# Patient Record
Sex: FEMALE | Race: WHITE
[De-identification: names, ages, dates, MRNs, and addresses within clinical notes are randomized per-mention and may not be internally consistent; named-entity substitution may affect disease eponyms.]

---

## 2018-01-16 ENCOUNTER — HOSPITAL ENCOUNTER (EMERGENCY)
Dept: HOSPITAL 62 - ER | Age: 53
Discharge: LEFT BEFORE DISCHARGE | End: 2018-01-16
Payer: MEDICAID

## 2018-01-16 VITALS — SYSTOLIC BLOOD PRESSURE: 121 MMHG | DIASTOLIC BLOOD PRESSURE: 70 MMHG

## 2018-01-16 DIAGNOSIS — Z53.21: Primary | ICD-10-CM

## 2018-04-26 ENCOUNTER — HOSPITAL ENCOUNTER (EMERGENCY)
Dept: HOSPITAL 62 - ER | Age: 53
Discharge: HOME | End: 2018-04-26
Payer: MEDICAID

## 2018-04-26 VITALS — DIASTOLIC BLOOD PRESSURE: 88 MMHG | SYSTOLIC BLOOD PRESSURE: 148 MMHG

## 2018-04-26 DIAGNOSIS — M54.9: ICD-10-CM

## 2018-04-26 DIAGNOSIS — F17.200: ICD-10-CM

## 2018-04-26 DIAGNOSIS — N30.00: ICD-10-CM

## 2018-04-26 DIAGNOSIS — I10: ICD-10-CM

## 2018-04-26 DIAGNOSIS — R50.9: ICD-10-CM

## 2018-04-26 DIAGNOSIS — J44.9: ICD-10-CM

## 2018-04-26 DIAGNOSIS — J11.1: Primary | ICD-10-CM

## 2018-04-26 DIAGNOSIS — G89.29: ICD-10-CM

## 2018-04-26 LAB
ADD MANUAL DIFF: NO
ALBUMIN SERPL-MCNC: 4 G/DL (ref 3.5–5)
ALP SERPL-CCNC: 101 U/L (ref 38–126)
ALT SERPL-CCNC: 34 U/L (ref 9–52)
ANION GAP SERPL CALC-SCNC: 10 MMOL/L (ref 5–19)
APPEARANCE UR: CLEAR
APTT PPP: YELLOW S
AST SERPL-CCNC: 22 U/L (ref 14–36)
BASOPHILS # BLD AUTO: 0 10^3/UL (ref 0–0.2)
BASOPHILS NFR BLD AUTO: 0.3 % (ref 0–2)
BILIRUB DIRECT SERPL-MCNC: 0.3 MG/DL (ref 0–0.4)
BILIRUB SERPL-MCNC: 0.4 MG/DL (ref 0.2–1.3)
BILIRUB UR QL STRIP: NEGATIVE
BUN SERPL-MCNC: 15 MG/DL (ref 7–20)
CALCIUM: 11.1 MG/DL (ref 8.4–10.2)
CHLORIDE SERPL-SCNC: 108 MMOL/L (ref 98–107)
CO2 SERPL-SCNC: 24 MMOL/L (ref 22–30)
EOSINOPHIL # BLD AUTO: 0 10^3/UL (ref 0–0.6)
EOSINOPHIL NFR BLD AUTO: 0.2 % (ref 0–6)
ERYTHROCYTE [DISTWIDTH] IN BLOOD BY AUTOMATED COUNT: 13.9 % (ref 11.5–14)
GLUCOSE SERPL-MCNC: 91 MG/DL (ref 75–110)
GLUCOSE UR STRIP-MCNC: NEGATIVE MG/DL
HCT VFR BLD CALC: 35.4 % (ref 36–47)
HGB BLD-MCNC: 12 G/DL (ref 12–15.5)
KETONES UR STRIP-MCNC: NEGATIVE MG/DL
LYMPHOCYTES # BLD AUTO: 1.4 10^3/UL (ref 0.5–4.7)
LYMPHOCYTES NFR BLD AUTO: 12.1 % (ref 13–45)
MCH RBC QN AUTO: 31 PG (ref 27–33.4)
MCHC RBC AUTO-ENTMCNC: 33.9 G/DL (ref 32–36)
MCV RBC AUTO: 92 FL (ref 80–97)
MONOCYTES # BLD AUTO: 0.9 10^3/UL (ref 0.1–1.4)
MONOCYTES NFR BLD AUTO: 7.6 % (ref 3–13)
NEUTROPHILS # BLD AUTO: 9 10^3/UL (ref 1.7–8.2)
NEUTS SEG NFR BLD AUTO: 79.8 % (ref 42–78)
NITRITE UR QL STRIP: NEGATIVE
PH UR STRIP: 6 [PH] (ref 5–9)
PLATELET # BLD: 241 10^3/UL (ref 150–450)
POTASSIUM SERPL-SCNC: 4.8 MMOL/L (ref 3.6–5)
PROT SERPL-MCNC: 6.9 G/DL (ref 6.3–8.2)
PROT UR STRIP-MCNC: NEGATIVE MG/DL
RBC # BLD AUTO: 3.87 10^6/UL (ref 3.72–5.28)
SODIUM SERPL-SCNC: 141.5 MMOL/L (ref 137–145)
SP GR UR STRIP: 1.01
TOTAL CELLS COUNTED % (AUTO): 100 %
UROBILINOGEN UR-MCNC: 2 MG/DL (ref ?–2)
WBC # BLD AUTO: 11.3 10^3/UL (ref 4–10.5)

## 2018-04-26 PROCEDURE — 99284 EMERGENCY DEPT VISIT MOD MDM: CPT

## 2018-04-26 PROCEDURE — 36415 COLL VENOUS BLD VENIPUNCTURE: CPT

## 2018-04-26 PROCEDURE — 87086 URINE CULTURE/COLONY COUNT: CPT

## 2018-04-26 PROCEDURE — 80053 COMPREHEN METABOLIC PANEL: CPT

## 2018-04-26 PROCEDURE — 94640 AIRWAY INHALATION TREATMENT: CPT

## 2018-04-26 PROCEDURE — 71046 X-RAY EXAM CHEST 2 VIEWS: CPT

## 2018-04-26 PROCEDURE — 96365 THER/PROPH/DIAG IV INF INIT: CPT

## 2018-04-26 PROCEDURE — 85025 COMPLETE CBC W/AUTO DIFF WBC: CPT

## 2018-04-26 PROCEDURE — 81001 URINALYSIS AUTO W/SCOPE: CPT

## 2018-04-26 NOTE — RADIOLOGY REPORT (SQ)
EXAM DESCRIPTION:  CHEST 2 VIEWS



COMPLETED DATE/TIME:  4/26/2018 10:30 am



REASON FOR STUDY:  cough with fever



COMPARISON:  9/25/2014



EXAM PARAMETERS:  NUMBER OF VIEWS: two views

TECHNIQUE: Digital Frontal and Lateral radiographic views of the chest acquired.

RADIATION DOSE: NA

LIMITATIONS: none



FINDINGS:  LUNGS AND PLEURA: No opacities, masses or pneumothorax. No pleural effusion.

MEDIASTINUM AND HILAR STRUCTURES: No masses or contour abnormalities.

HEART AND VASCULAR STRUCTURES: Heart normal size.  No evidence for failure.

BONES: No acute findings.

HARDWARE: None in the chest.

OTHER: No other significant finding.



IMPRESSION:  NO ACUTE RADIOGRAPHIC FINDING IN THE CHEST.



TECHNICAL DOCUMENTATION:  JOB ID:  [id number]

 2011 [employer]- All Rights Reserved



Reading location - IP/workstation name: [email or web address]

## 2018-04-26 NOTE — ER DOCUMENT REPORT
ED General





- General


Chief Complaint: Fever


Stated Complaint: FEVER


Time Seen by Provider: 04/26/18 09:05


Mode of Arrival: Ambulatory


Information source: Patient


TRAVEL OUTSIDE OF THE [place] IN LAST 30 DAYS: No





- HPI


Notes: 





52-year-old female with a history of COPD, hypertension, hyperlipidemia and 

chronic back pain presents today with complaints of chills and coughing for the 

last 2 days.  Patient is a pack a day smoker.  Worse with time, nothing makes 

better.  Took Tylenol this morning.  Denies any hemoptysis.  Patient does not 

know if she had an exact fever because she did not take her temperature but 

reported chills.  Eating and drinking without issues.  Denies fevers, chest pain

,palpitations,  shortness of breath, dyspnea, nausea, vomiting, diarrhea, 

abdominal pain, hematuria,blurred vision, double vision, loss of vision, speech 

changes, LH, dizziness, syncope, headaches, wheezing, ST, neck pain, weakness, 

bowel or bladder dysfunction, saddle anesthesia, numbness or tingling in 

bilateral upper or lower extremities equally, muscle paralysis, weakness in 

bilateral upper or lower extremities equally or rash. Denies IV drug use.





- Related Data


Allergies/Adverse Reactions: 


 





azithromycin [Azithromycin] Allergy (Verified 01/16/18 18:37)


 


latex [Latex] Allergy (Verified 01/16/18 18:37)


 


prednisone [Prednisone] Allergy (Verified 01/16/18 18:37)


 











Past Medical History





- General


Information source: Patient





- Social History


Smoking Status: Current Every Day Smoker


Frequency of alcohol use: Occasional


Drug Abuse: None


Family History: Reviewed & Not Pertinent, Other - Blood clot, daughter Son and 

mother kidney disease


Patient has suicidal ideation: No


Patient has homicidal ideation: No





- Past Medical History


Cardiac Medical History: Reports: Hx DVT, Hx Hypercholesterolemia, Hx 

Hypertension


Pulmonary Medical History: Reports: Hx COPD


Neurological Medical History: Reports: Hx Migraine.  Denies: Hx Seizures


Renal/ Medical History: Reports: Hx Kidney Stones.  Denies: Hx Peritoneal 

Dialysis


GI Medical History: Reports: Hx Gastroesophageal Reflux Disease


Musculoskeltal Medical History: Reports Hx Arthritis


Psychiatric Medical History: Reports: Hx Depression


Past Surgical History: Reports: Hx Hysterectomy, Hx Orthopedic Surgery - back x 

6, Hx Tubal Ligation





- Immunizations


Hx Diphtheria, Pertussis, Tetanus Vaccination: Yes





Review of Systems





- Review of Systems


Constitutional: No symptoms reported


EENT: See HPI


Cardiovascular: No symptoms reported


Respiratory: See HPI


Gastrointestinal: No symptoms reported


Genitourinary: No symptoms reported


Female Genitourinary: No symptoms reported


Musculoskeletal: No symptoms reported


Skin: No symptoms reported


Hematologic/Lymphatic: No symptoms reported


Neurological/Psychological: No symptoms reported





Physical Exam





- Vital signs


Vitals: 


 











Temp Pulse Resp BP Pulse Ox


 


 100.0 F   118 H  16   130/84 H  97 


 


 04/26/18 08:57  04/26/18 08:57  04/26/18 08:57  04/26/18 08:57  04/26/18 08:57














- Notes


Notes: 





PHYSICAL EXAMINATION:





GENERAL: Well-appearing, well-nourished and in no acute distress.





HEAD: Atraumatic, normocephalic.





EYES: Pupils equal round and reactive to light, extraocular movements intact, 

conjunctiva are normal.





ENT: Nares patent, oropharynx clear without exudates.  Moist mucous membranes.





NECK: Normal range of motion, supple without lymphadenopathy





LUNGS: Diminished breath sounds in bilateral upper lobe, cleared after 

breathing treatment.  no wheezes rales or rhonchi.





HEART: Regular rate and rhythm without murmurs





ABDOMEN: Soft, nontender, nondistended abdomen.  No guarding, no rebound.  No 

masses appreciated.





Female : deferred





Musculoskeletal: Normal range of motion, no pitting or edema.  No cyanosis.





NEUROLOGICAL: Cranial nerves grossly intact.  Normal speech, normal gait.  

Normal sensory, motor exams





PSYCH: Normal mood, normal affect.





SKIN: Warm, Dry, normal turgor, no rashes or lesions noted.





Course





- Re-evaluation


Re-evalutation: 





04/26/18 11:00


52-year-old female who is afebrile, slightly tachycardic stress and vitals are 

otherwise stable for evaluation of fever, congestion with dysuria.  CBC shows 

slight leukocytosis without anemia.  CMP negative for acute renal failure 

however this seems to be her baseline with a Cr of 1.31, which is elevated from 

when seen by her PCP, Dr. Bart Cowan office, with a BUN of 15  We will 

give patient 1 L of normal saline fluid and Rocephin IV.  tachycardia is likely 

related to her fever.  Patient given Tylenol.  Patient likely has influenza a 

sudden onset of fevers with myalgias.  Was started on Tamiflu.  Urinalysis 

shows that patient does have a UTI.  Spoke with Dr. Bart Cowan office at 

1130 who is her primary care provider, he will see her tomorrow before 11 AM in 

his office.  Will place patient on bactrim BID.  On another reevaluation, 

patient reports she is feeling better.  At this time will discharge with return 

precautions and follow-up recommendations.  Verbal discharge instructions given 

a the bedside and opportunity for questions given. Medication warnings 

reviewed. Patient is in agreement with this plan and has verbalized 

understanding of return precautions and the need for primary care follow-up in 

the next 24-72 hours.











After performing a Medical Screening Examination, I estimate there is LOW risk 

for ACUTE CORONARY SYNDROME, PULMONARY EMBOLI, RESPIRATORY FAILURE, SEPSIS OR 

MENINGITIS, thus I consider the discharge disposition reasonable.  I have 

reevaluated this patient multiple times and no significant life threatening 

changes are noted. The patient and I have discussed the diagnosis and risks, 

and we agree with discharging home with close follow-up. We also discussed 

returning to the Emergency Department immediately if new or worsening symptoms 

occur. We have discussed the symptoms which are most concerning (e.g., changing 

or worsening pain, trouble swallowing or breathing, neck stiffness, fever) that 

necessitate immediate return.














- Vital Signs


Vital signs: 


 











Temp Pulse Resp BP Pulse Ox


 


 100.0 F   118 H  16   130/84 H  97 


 


 04/26/18 08:57  04/26/18 08:57  04/26/18 08:57  04/26/18 08:57  04/26/18 08:57














- Laboratory


Result Diagrams: 


 04/26/18 09:45





 04/26/18 09:45


Laboratory results interpreted by me: 


 











  04/26/18 04/26/18 04/26/18





  09:45 09:45 09:45


 


WBC  11.3 H  


 


Hct  35.4 L  


 


Seg Neutrophils %  79.8 H  


 


Lymphocytes %  12.1 L  


 


Absolute Neutrophils  9.0 H  


 


Chloride   108 H 


 


Creatinine   1.31 H 


 


Est GFR ( Amer)   52 L 


 


Est GFR (Non-Af Amer)   43 L 


 


Calcium   11.1 H 


 


Urine Urobilinogen    2.0 H


 


Ur Leukocyte Esterase    TRACE H














Discharge





- Discharge


Clinical Impression: 


 URI with cough and congestion, Flu syndrome





UTI (urinary tract infection)


Qualifiers:


 Urinary tract infection type: acute cystitis Hematuria presence: without 

hematuria Qualified Code(s): N30.00 - Acute cystitis without hematuria





Condition: Good


Disposition: HOME, SELF-CARE


Instructions:  Trimethoprim-Sulfa ([facility]), Upper Respiratory Illness ([facility]), 

Urinary Anesthetic Agent ([facility]), Urinary Tract Infection ([facility]), Influenza ([facility])


Additional Instructions: 


Upper Respiratory Illness





     You have a viral infection of the respiratory passages -- a "cold."  This 

common infection causes nasal congestion, drainage, and often sore throat and 

cough.  It is caused by a virus and is highly contagious.  The disease usually 

lasts a week or more, though the worst symptoms are usually over in 3 or 4 days.


     There is no "cure" for the viral infection -- it must run its course.  If 

there is a complication, such as bacterial infection in the nose, sinuses, 

middle ear, or bronchial tubes, antibiotics may be required, but antibiotics won

't affect the virus.


     If you smoke, you should STOP!!  Drink plenty of fluids.  A humidifier may 

help.  An expectorant medication or decongestant may make you more comfortable.

  Use acetaminophen or ibuprofen for fever or aches.


     See the doctor if fever persists over two or three days, if there is any 

significant worsening of your symptoms, or if you simply fail to improve as 

expected.








Urinary Tract Infection





     Your evaluation indicates that you have a urinary tract infection. This is 

due to germs growing in the bladder.  This is a common problem.


     This infection usually responds quickly to antibiotics.  Your antibiotic 

should be taken exactly as prescribed.  Drink plenty of fluids -- three to four 

quarts a day.


     Occasionally, a bladder anesthetic will be prescribed to help stop the 

feeling of urgency until the antibiotic has a chance to clear the infection.  

This may cause your urine to be dark orange.


     Certain urine infections require a culture.  If the doctor obtained a 

culture, the results will be back in two days.  You should call to see if a 

change in treatment is needed.


     A repeat urinalysis after you finish treatment is often recommended.  The 

physician will let you know if further testing is required.


     Call the doctor if you develop fever, chills, flank pain, inability to 

urinate, or blood in the urine.





Follow-up with primary care provider within 24 hours Take antibiotic with food 

as directed.  Take Pyridium 3 times a day as needed, this will turn her urine 

orange.  Increase oral hydration with water.  Drink cranberry juice for you to 

help assist in treating her UTI.  Use Ventolin inhaler as needed.  Take over-the

-counter ibuprofen and Tylenol as directed.  Return to the ER symptoms become 

worse.





Return immediately for any new or worsening symptoms.





Follow up with primary care provider, call tomorrow to make followup 

appointment.





Prescriptions: 


Benzonatate [Tessalon Perles 100 mg Capsule] 100 mg PO Q8HP PRN #20 capsule


 PRN Reason: 


Albuterol Sulfate [Ventolin Hfa] 1 - 2 puff IH Q4 PRN #1 [id number]


 PRN Reason: 


Sulfamethoxazole/Trimethoprim [Bactrim Ds Tablet] 1 each PO BID #20 tablet


Forms:  Return to Work


Referrals: 


BART COWAN MD [Primary Care Provider] - Follow up tomorrow

## 2018-05-04 ENCOUNTER — HOSPITAL ENCOUNTER (OUTPATIENT)
Dept: HOSPITAL 62 - RAD | Age: 53
End: 2018-05-04
Attending: INTERNAL MEDICINE
Payer: MEDICAID

## 2018-05-04 DIAGNOSIS — N18.3: Primary | ICD-10-CM

## 2018-05-04 PROCEDURE — 76770 US EXAM ABDO BACK WALL COMP: CPT

## 2018-05-04 NOTE — RADIOLOGY REPORT (SQ)
EXAM DESCRIPTION:  U/S RETROPERITON (RENAL/AORTA)



COMPLETED DATE/TIME:  5/4/2018 9:57 am



REASON FOR STUDY:  CKD III (N18.3) N18.3  CHRONIC KIDNEY DISEASE, STAGE 3 (MODERATE)



COMPARISON:  None.



TECHNIQUE:  Dynamic and static grayscale images acquired of the kidneys and bladder and recorded on P
ACS. Additional selected color Doppler and spectral images recorded.



LIMITATIONS:  None.



FINDINGS:  RIGHT KIDNEY: Normal size, 9.5 x 4.8 x 4.9 cm.  Normal echogenicity. No solid or suspiciou
s masses. No hydronephrosis. No calcifications.

LEFT KIDNEY:  Normal size, 9.9 x 4.4 x 4.3 cm.  . Normal echogenicity. No solid or suspicious masses.
 No hydronephrosis. No calcifications.

BLADDER: The bladder is incompletely filled and not well evaluated.  Urinary jets were not seen.

OTHER FINDINGS: No other significant finding.



IMPRESSION:  Normal renal ultrasound.  The bladder was not well evaluated.



TECHNICAL DOCUMENTATION:  JOB ID:  [id number]

 2011 [employer]- All Rights Reserved



Reading location - IP/workstation name: [email or web address]

## 2018-06-11 ENCOUNTER — HOSPITAL ENCOUNTER (OUTPATIENT)
Dept: HOSPITAL 62 - RAD | Age: 53
End: 2018-06-11
Attending: INTERNAL MEDICINE
Payer: MEDICAID

## 2018-06-11 DIAGNOSIS — R13.19: Primary | ICD-10-CM

## 2018-06-11 PROCEDURE — 74220 X-RAY XM ESOPHAGUS 1CNTRST: CPT

## 2018-06-11 NOTE — RADIOLOGY REPORT (SQ)
EXAM DESCRIPTION:  BARIUM SWALLOW ESOPHAGUS



COMPLETED DATE/TIME:  6/11/2018 8:49 am



REASON FOR STUDY:  INTERMITTENT DYSPHAGIA (R13.19) R13.19  OTHER DYSPHAGIA



COMPARISON:  Chest films 4/26/2018, 10/26/2017



TECHNIQUE:  Under fluoroscopic guidance, patient ingested effervescent granules followed by thick and
 thin barium. Fluoroscopic spot images and routine radiographic images acquired and stored on PACS.

12 MM BARIUM TABLET GIVEN: Yes.

No significant delay in passage.



LIMITATIONS:  None.



FLUOROSCOPY TIME:  FLUORO TIME: 1 minutes 37 seconds

4 series of digital radiographic images saved to PACS.



FINDINGS:  NEUROMUSCULAR COORDINATION OF SWALLOW: Normal. No aspiration.

ESOPHAGEAL MOTILITY: Normal peristalsis. No esophageal spasm.

ESOPHAGEAL MUCOSA: Normal mucosa without masses or ulceration.

GASTRO-ESOPHAGEAL JUNCTION: No hiatal hernia or reflux.

NON-GI TRACT STRUCTURES: No significant finding.

OTHER: No other significant finding.



IMPRESSION:  NORMAL DOUBLE CONTRAST BARIUM SWALLOW.



COMMENT:  Quality :  Final reports for procedures using fluoroscopy that document radiation exp
osure indices, or exposure time and number of fluorographic images (if radiation exposure indices are
 not available)



TECHNICAL DOCUMENTATION:  JOB ID:  [id number]

 2011 [email or web address]- All Rights Reserved



Reading location - IP/workstation name: [facility]

## 2018-08-24 ENCOUNTER — HOSPITAL ENCOUNTER (EMERGENCY)
Dept: HOSPITAL 62 - ER | Age: 53
Discharge: HOME | End: 2018-08-24
Payer: MEDICAID

## 2018-08-24 VITALS — SYSTOLIC BLOOD PRESSURE: 117 MMHG | DIASTOLIC BLOOD PRESSURE: 72 MMHG

## 2018-08-24 DIAGNOSIS — N76.0: Primary | ICD-10-CM

## 2018-08-24 DIAGNOSIS — Z87.440: ICD-10-CM

## 2018-08-24 DIAGNOSIS — Z87.442: ICD-10-CM

## 2018-08-24 DIAGNOSIS — I10: ICD-10-CM

## 2018-08-24 DIAGNOSIS — R51: ICD-10-CM

## 2018-08-24 DIAGNOSIS — Z71.6: ICD-10-CM

## 2018-08-24 DIAGNOSIS — J44.9: ICD-10-CM

## 2018-08-24 DIAGNOSIS — R30.0: ICD-10-CM

## 2018-08-24 DIAGNOSIS — R09.82: ICD-10-CM

## 2018-08-24 DIAGNOSIS — Z88.8: ICD-10-CM

## 2018-08-24 DIAGNOSIS — R05: ICD-10-CM

## 2018-08-24 DIAGNOSIS — R39.15: ICD-10-CM

## 2018-08-24 DIAGNOSIS — R10.9: ICD-10-CM

## 2018-08-24 DIAGNOSIS — F17.210: ICD-10-CM

## 2018-08-24 DIAGNOSIS — R35.0: ICD-10-CM

## 2018-08-24 DIAGNOSIS — R09.81: ICD-10-CM

## 2018-08-24 DIAGNOSIS — Z91.041: ICD-10-CM

## 2018-08-24 DIAGNOSIS — J00: ICD-10-CM

## 2018-08-24 DIAGNOSIS — B96.89: ICD-10-CM

## 2018-08-24 LAB
APPEARANCE UR: CLEAR
APTT PPP: YELLOW S
BACTERIA (WET MOUNT): (no result)
BILIRUB UR QL STRIP: NEGATIVE
CHLAM PCR: NOT DETECTED
EPITHELIALS (WET MOUNT): (no result)
GLUCOSE UR STRIP-MCNC: NEGATIVE MG/DL
GON PCR: NOT DETECTED
KETONES UR STRIP-MCNC: NEGATIVE MG/DL
NITRITE UR QL STRIP: NEGATIVE
PH UR STRIP: 6 [PH] (ref 5–9)
PROT UR STRIP-MCNC: NEGATIVE MG/DL
SP GR UR STRIP: 1.01
T.VAGINALIS (WET MOUNT): (no result)
UROBILINOGEN UR-MCNC: NEGATIVE MG/DL (ref ?–2)
WBCS (WET MOUNT): (no result)
YEAST (WET MOUNT): (no result)

## 2018-08-24 PROCEDURE — 81001 URINALYSIS AUTO W/SCOPE: CPT

## 2018-08-24 PROCEDURE — 99406 BEHAV CHNG SMOKING 3-10 MIN: CPT

## 2018-08-24 PROCEDURE — 96372 THER/PROPH/DIAG INJ SC/IM: CPT

## 2018-08-24 PROCEDURE — 87591 N.GONORRHOEAE DNA AMP PROB: CPT

## 2018-08-24 PROCEDURE — 99283 EMERGENCY DEPT VISIT LOW MDM: CPT

## 2018-08-24 PROCEDURE — 87491 CHLMYD TRACH DNA AMP PROBE: CPT

## 2018-08-24 PROCEDURE — 87210 SMEAR WET MOUNT SALINE/INK: CPT

## 2018-08-24 PROCEDURE — 87086 URINE CULTURE/COLONY COUNT: CPT

## 2018-08-24 NOTE — ER DOCUMENT REPORT
ED GI/





- General


Chief Complaint: Pain With Urination


Stated Complaint: CONGESTION, HEADACHE, SORE THROAT


Time Seen by Provider: 08/24/18 15:35


Mode of Arrival: Ambulatory


Information source: Patient


Notes: 





52-year-old female presents to ED for complaint of pain and burning with 

urination and abnormal smells with urination for the last 4 days.  She states 

she has a history of kidney stones and UTIs.  She states she has been trying to 

drink more water but it is not improved.  She states she also has cough cold 

congestion runny nose with headache the last 2-3 days.  She states she took 

some Tylenol but she did not have any Mucinex at home.  She states she would 

love some Mucinex at this time.  She states that she does not think she has any 

STDs but she would like to be checked due to the foul-smelling to the urine.  

Patient is alert and oriented, pupils equal and react to light, respirations 

regular, and unlabored speaking in full sentences.


TRAVEL OUTSIDE OF THE [place] IN LAST 30 DAYS: No





- HPI


Patient complains to provider of: Dysuria, Vaginal discharge, Other - Runny 

nose cough congestion headache for the last 2-3 days


Onset: Other - See above


Timing/Duration: Gradual, Persistent


Quality of pain: Achy - Headache, Burning - Burning with urination


Severity at maximum: Moderate


Severity in ED: Moderate


Pain Level: 3


Location: Pelvis, Vaginal, Other - Headache


Vaginal bleeding (Compared to normal period): None


Sexual history: Active


Associated symptoms: Urinary frequency, Urinary urgency, Vaginal discharge, 

Other - Burning with urination, cough cold congestion runny nose headache


Exacerbated by: Other - Urination


Relieved by: Denies


Similar symptoms previously: Yes


Recently seen / treated by doctor: No





- Related Data


Allergies/Adverse Reactions: 


 





latex [Latex] Allergy (Verified 01/16/18 18:37)


 


prednisone [Prednisone] Allergy (Verified 01/16/18 18:37)


 











Past Medical History





- General


Information source: Patient





- Social History


Smoking Status: Current Every Day Smoker


Cigarette use (# per day): Yes - Pack per day


Chew tobacco use (# tins/day): No


Smoking Education Provided: Yes - 4 minutes


Frequency of alcohol use: Rare


Drug Abuse: None


Family History: Reviewed & Not Pertinent, Other - Blood clot, daughter Son and 

mother kidney disease


Patient has suicidal ideation: No


Patient has homicidal ideation: No





- Past Medical History


Cardiac Medical History: Reports: Hx DVT, Hx Hypercholesterolemia, Hx 

Hypertension


Pulmonary Medical History: Reports: Hx Bronchitis, Hx COPD, Hx Pneumonia


EENT Medical History: Reports: None


Neurological Medical History: Reports: Hx Migraine


Endocrine Medical History: Reports: None


Renal/ Medical History: Reports: Hx Kidney Stones


Malignancy Medical History: Reports: None


GI Medical History: Reports: Hx Gastroesophageal Reflux Disease, Hx Colonoscopy

, Hx Endoscopy


Musculoskeletal Medical History: Reports Hx Arthritis, Reports Hx 

Musculoskeletal Trauma


Skin Medical History: Reports None


Psychiatric Medical History: Reports: Hx Anxiety, Hx Depression


Traumatic Medical History: Reports: Hx Fractures - Foot


Infectious Medical History: Reports: None


Past Surgical History: Reports: Hx Orthopedic Surgery - back x 6, right knee, 

Hx Tubal Ligation





- Immunizations


Immunizations up to date: Yes


Hx Diphtheria, Pertussis, Tetanus Vaccination: Yes





Review of Systems





- Review of Systems


Constitutional: Chills, Recent illness


EENT: Nose congestion, Nose discharge, Sinus pressure, Sinus discharge


Cardiovascular: No symptoms reported


Respiratory: Cough


Gastrointestinal: Abdominal pain


Genitourinary: Burning, Frequency, Urgency


Female Genitourinary: Vaginal discharge, Vaginal odor


Musculoskeletal: No symptoms reported


Skin: No symptoms reported


Hematologic/Lymphatic: No symptoms reported


Neurological/Psychological: No symptoms reported


-: Yes All other systems reviewed and negative





Physical Exam





- Vital signs


Vitals: 


 











Temp Pulse Resp BP Pulse Ox


 


 98.0 F   77   16   117/72   97 


 


 08/24/18 15:21  08/24/18 15:21  08/24/18 15:21  08/24/18 15:21  08/24/18 15:21











Interpretation: Normal





- General


General appearance: Appears well, Alert





- HEENT


Head: Normocephalic, Atraumatic


Eyes: Normal


Pupils: PERRL


Ears: Normal


External canal: Normal


Tympanic membrane: Normal


Sinus: Frontal, Tenderness


Nasal: Purulent discharge, Swelling


Mouth/Lips: Normal


Mucous membranes: Normal


Pharynx: Post nasal drainage


Neck: Normal





- Respiratory


Respiratory status: No respiratory distress


Chest status: Nontender


Breath sounds: Normal


Chest palpation: Normal





- Cardiovascular


Rhythm: Regular


Heart sounds: Normal auscultation


Murmur: No





- Abdominal


Inspection: Normal


Distension: No distension


Bowel sounds: Normal


Tenderness: Nontender


Organomegaly: No organomegaly





- Back


Back: Normal, Nontender, Scars





- Extremities


General upper extremity: Normal inspection, Nontender, Normal color, Normal ROM

, Normal temperature


General lower extremity: Normal inspection, Nontender, Normal color, Normal ROM

, Normal temperature, Normal weight bearing.  No: Albert's sign





- Neurological


Neuro grossly intact: Yes


Cognition: Normal


Orientation: AAOx4


[place] Coma Scale Eye Opening: Spontaneous


[place] Coma Scale Verbal: Oriented


[place] Coma Scale Motor: Obeys Commands


[place] Coma Scale Total: 15


Speech: Normal


Motor strength normal: LUE, RUE, LLE, RLE


Sensory: Normal





- Psychological


Associated symptoms: Normal affect, Normal mood





- Skin


Skin Temperature: Warm


Skin Moisture: Dry


Skin Color: Normal





Course





- Re-evaluation


Re-evalutation: 





08/24/18 16:27


Discussed results of urine and wet mount with patient.  Results of both given 

to patient.  Patient will be treated with Flagyl for her bacterial vaginosis.  

She has been instructed to call the results of her GC chlamydia within the next 

2 hours.  Patient verbalized understanding and agreement with treatment plan.





- Vital Signs


Vital signs: 


 











Temp Pulse Resp BP Pulse Ox


 


 98.0 F   77   16   117/72   97 


 


 08/24/18 15:21  08/24/18 15:21  08/24/18 15:21  08/24/18 15:21  08/24/18 15:21














Discharge





- Discharge


Clinical Impression: 


 Bacterial vaginosis





Condition: Stable


Disposition: HOME, SELF-CARE


Additional Instructions: 


VAGINOSIS, BACTERIAL:





     Your exam shows you have bacterial vaginosis. This condition is due to an 

overgrowth of bacteria in the vagina. Symptoms may include vaginal itching or 

pain, a smelly discharge, and sometimes burning with urination. Normally this 

is not transmitted by sexual contact.


     Vaginosis can be treated with oral or topical antibiotics. Metronidazole (

Flagyl) pills are usually effective. Topical vaginal creams include Cleocin and 

Metro-Gel. You should avoid sexual contact until your symptoms are all better.


     Call the doctor if you develop pelvic pain, fever, or problems with 

urination, or if you don't improve as expected.





CEPHALOSPORINS:


     An antibiotic of the cephalosporin class has been prescribed. This type of 

antibiotic covers a wide variety of infections, including those of the skin, 

lungs, middle ear, and urinary tract.


     This antibiotic is somewhat similar to the penicillin family. In rare cases

, a person who is allergic to penicillin will also be allergic to this 

medication.  If you have had a severe allergic reaction to penicillin, and have 

not taken this antibiotic since that time, notify your doctor.


     Antibiotics which cover many germs ("broad spectrum" antibiotics) are more 

likely to cause diarrhea or "yeast" infections.  Women prone to vaginal yeast 

problems may suffer an attack after taking this antibiotic.  In infants, oral 

thrush (white spots "stuck" on the cheek) or yeast diaper rash may result.  See 

your doctor if these problems occur.


     Call the doctor at once if you develop hives, itching, shortness of breath

, or lightheadedness.





AZITHROMYCIN:


     Azithromycin (Zithromax) is a broad spectrum antibiotic in the same class 

as erythromycin.  It can treat a variety of bacterial infections, but is most 

frequently used for respiratory infections.


     Azithromycin is extremely long-lasting.  It accumulates in body tissues 

and continues to kill bacteria for many days.


     In order to improve absorption, Azithromycin should be taken at least one 

hour before or two hours after a meal.  It does not have the same strong 

tendency to upset the stomach as erythromycin and is usually very well 

tolerated.


     Patients who have had a rash or other true allergic reactions to 

erythromycin should not take this medication.  Call if you develop 

gastrointestinal distress, severe diarrhea, rash, hives, itching, or shortness 

of breath.








METRONIDAZOLE:


     Metronidazole (Flagyl) has been prescribed.  This medication is used to 

kill a type of bacteria called anaerobes, and protozoan parasites such as 

trichomonas and Giardia.


     Flagyl often causes a metallic taste in the mouth and mild nausea.


     Do not use alcohol in any form with Flagyl (including alcohol in 

medication elixirs).  Flagyl interacts with alcohol to cause flushing, 

palpitations, headache, stomach cramps, and vomiting.  Do not use Flagyl if you 

are taking Antabuse (disulfiram).


     Call the doctor at once if you develop rash, shortness of breath, itching, 

or lightheadedness.








Please call Shavon in 2 hours at [phone] for your other results.








FOLLOW-UP CARE:


If you have been referred to a physician for follow-up care, call the physician

s office for an appointment as you were instructed or within the next two days.

  If you experience worsening or a significant change in your symptoms, notify 

the physician immediately or return to the Emergency Department at any time for 

re-evaluation.


Prescriptions: 


Metronidazole [Flagyl 500 mg Tablet] 500 mg PO BID #14 tablet


Referrals: 


BART COWAN MD [Primary Care Provider] - Follow up in 3-5 days

## 2018-10-16 ENCOUNTER — HOSPITAL ENCOUNTER (OUTPATIENT)
Dept: HOSPITAL 62 - WI | Age: 53
End: 2018-10-16
Attending: INTERNAL MEDICINE
Payer: MEDICAID

## 2018-10-16 DIAGNOSIS — Z12.31: Primary | ICD-10-CM

## 2018-10-16 PROCEDURE — 77067 SCR MAMMO BI INCL CAD: CPT

## 2018-10-16 PROCEDURE — 77063 BREAST TOMOSYNTHESIS BI: CPT

## 2018-10-19 NOTE — WOMENS IMAGING REPORT
EXAM DESCRIPTION:  3D SCREENING MAMMO BILAT



COMPLETED DATE/TIME:  10/16/2018 11:34 am



REASON FOR STUDY:  BILATERAL SCREENING MAMMO 3D/Z12.31 Z12.31  ENCNTR SCREEN MAMMOGRAM FOR MALIGNANT 
NEOPLASM OF YOSI



COMPARISON:  8/13/2013.



TECHNIQUE:  Standard craniocaudal and mediolateral oblique views of each breast recorded using digita
l acquisition and breast tomosynthesis.



LIMITATIONS:  None.



FINDINGS:  Findings present which are benign by mammographic criteria.  No suspicious masses, calcifi
cations or architectural distortion.

Pertinent benign findings: Asymmetry in the upper-outer left breast essentially unchanged.  There has
 been interval fatty involution of both breasts.

Read with the assistance of CAD.

.[facility] - R2 Cenova Version 1.3

.[facility] - R2 Cenova Version 1.3

.[facility] - R2 Cenova Version 2.4

.[facility] [place] - R2 Cenova Version 2.4

.[facility] - R2  Version 9.2

Benign mammographic findings may include one or more of the following:  Smooth masses, popcorn/rim/co
arse calcifications, asymmetries, post-procedure changes, and lesions with long-standing stability.



IMPRESSION:  BENIGN MAMMOGRAPHIC FINDINGS.  BIRADS 2



BREAST DENSITY:  b. There are scattered areas of fibroglandular density.



BIRAD:  2 BENIGN FINDING(S)



RECOMMENDATION:  RECOMMENDATION: ROUTINE SCREENING



COMMENT:  The patient has been notified of the results by letter per SA requirements. Additional no
tification policies are in place for contacting patient with suspicious or incomplete findings.

Quality ID #225: The American College of Radiology recommends an annual screening mammogram for women
 aged 40 years or over. This facility utilizes a reminder system to ensure that all patients receive 
reminder letters, and/or direct phone calls for appointments. This includes reminders for routine scr
eening mammograms, diagnostic mammograms, or other Breast Imaging Interventions when appropriate.  Th
is patient will be placed in the appropriate reminder system.

The American College of Radiology (ACR) has developed recommendations for screening MRI of the breast
s in certain patient populations, to be used in conjunction with mammography.  Breast MRI surveillanc
e may be appropriate for women with more than 20% lifetime risk of developing breast cancer  as deter
mined by genetic testing, significant family history of the disease, or history of mantle radiation f
or Hodgkins Disease.  ACR Practice Guidelines 2008.

[employer]



PQRS 6045F: Fluoroscopic imaging is not utilized for breast tomosynthesis.



TECHNICAL DOCUMENTATION:  FINDING NUMBER: (1)

ASSESSMENT: (1)

JOB ID:  [id number]

 2011 [employer]- All Rights Reserved



Reading location - IP/workstation name: [email or web address]

## 2018-10-31 ENCOUNTER — HOSPITAL ENCOUNTER (EMERGENCY)
Dept: HOSPITAL 62 - ER | Age: 53
Discharge: HOME | End: 2018-10-31
Payer: MEDICAID

## 2018-10-31 VITALS — SYSTOLIC BLOOD PRESSURE: 142 MMHG | DIASTOLIC BLOOD PRESSURE: 80 MMHG

## 2018-10-31 DIAGNOSIS — Z91.040: ICD-10-CM

## 2018-10-31 DIAGNOSIS — R31.9: ICD-10-CM

## 2018-10-31 DIAGNOSIS — N13.2: Primary | ICD-10-CM

## 2018-10-31 DIAGNOSIS — F17.200: ICD-10-CM

## 2018-10-31 DIAGNOSIS — G89.29: ICD-10-CM

## 2018-10-31 DIAGNOSIS — J44.9: ICD-10-CM

## 2018-10-31 DIAGNOSIS — M54.9: ICD-10-CM

## 2018-10-31 DIAGNOSIS — I10: ICD-10-CM

## 2018-10-31 DIAGNOSIS — Z88.8: ICD-10-CM

## 2018-10-31 LAB
APPEARANCE UR: (no result)
APTT PPP: YELLOW S
BILIRUB UR QL STRIP: NEGATIVE
GLUCOSE UR STRIP-MCNC: NEGATIVE MG/DL
KETONES UR STRIP-MCNC: NEGATIVE MG/DL
NITRITE UR QL STRIP: NEGATIVE
PH UR STRIP: 5 [PH] (ref 5–9)
PROT UR STRIP-MCNC: 30 MG/DL
SP GR UR STRIP: 1.01
UROBILINOGEN UR-MCNC: NEGATIVE MG/DL (ref ?–2)

## 2018-10-31 PROCEDURE — 99284 EMERGENCY DEPT VISIT MOD MDM: CPT

## 2018-10-31 PROCEDURE — 96376 TX/PRO/DX INJ SAME DRUG ADON: CPT

## 2018-10-31 PROCEDURE — 76380 CAT SCAN FOLLOW-UP STUDY: CPT

## 2018-10-31 PROCEDURE — 96374 THER/PROPH/DIAG INJ IV PUSH: CPT

## 2018-10-31 PROCEDURE — 96375 TX/PRO/DX INJ NEW DRUG ADDON: CPT

## 2018-10-31 PROCEDURE — 81001 URINALYSIS AUTO W/SCOPE: CPT

## 2018-10-31 NOTE — RADIOLOGY REPORT (SQ)
EXAM DESCRIPTION:  CT LTD RENAL STONE PROTOCOL ON



COMPLETED DATE/TIME:  10/31/2018 8:47 pm



REASON FOR STUDY:  right flank pain



COMPARISON:  None.



TECHNIQUE:  CT scan of the abdomen and pelvis performed without intravenous or oral contrast. Images 
reviewed with lung, soft tissue, and bone windows. Reconstructed coronal and sagittal MPR images revi
ewed. All images stored on PACS.

All CT scanners at this facility use dose modulation, iterative reconstruction, and/or weight based d
osing when appropriate to reduce radiation dose to as low as reasonably achievable (ALARA).

[facility]: Dose Right  [facility]: CareDose    [facility]: Dose Right    [facility]: Teradose 4D    [facility]: Smart Technologies



RADIATION DOSE:  CT Rad equipment meets quality standard of care and radiation dose reduction techniq
ues were employed. CTDIvol: 9.1 mGy. DLP: 474 mGy-cm.mGy.



LIMITATIONS:  None.



FINDINGS:  LOWER CHEST: No significant findings. No nodules or infiltrates.

NON-CONTRASTED LIVER, SPLEEN, ADRENALS: Evaluation limited by lack of IV contrast. No identified sign
ificant masses.

PANCREAS: No masses. No peripancreatic inflammatory changes.

GALLBLADDER: No identified stones by CT criteria. No inflammatory changes to suggest cholecystitis.

RIGHT KIDNEY AND URETER: Renal edema.   Nonobstructive nephrolithiasis.   5.6 mm calculus at the UV j
unction cause and moderate proximal hydronephrosis and hydroureter.

LEFT KIDNEY AND URETER: No suspicious masses. Assessment limited by lack of IV contrast.   Nonobstruc
tive nephrolithiasis.   No hydronephrosis or hydroureter.

AORTA AND RETROPERITONEUM: No aneurysm. No retroperitoneal masses or adenopathy.

BOWEL AND PERITONEAL CAVITY: No obvious masses or inflammatory changes. No free fluid.

APPENDIX: Normal.

PELVIS, BLADDER, AND ABDOMINAL WALL:No abnormal masses. No free fluid. Bladder normal.

BONES: Surgical changes.

OTHER: No other significant finding.



IMPRESSION:  5.6 mm calculus at the right UV junction with moderate hydronephrosis and hydroureter.

Nonobstructive left nephrolithiasis.



COMMENT:  Quality ID # 436: Final reports with documentation of one or more dose reduction techniques
 (e.g., Automated exposure control, adjustment of the mA and/or kV according to patient size, use of 
iterative reconstruction technique)



TECHNICAL DOCUMENTATION:  JOB ID:  [id number]

 2011 [employer]- All Rights Reserved



Reading location - IP/workstation name: [email or web address]

## 2018-10-31 NOTE — ER DOCUMENT REPORT
ED General





- General


Chief Complaint: Flank Pain


Stated Complaint: RIGHT FLANK PAIN, BLADDER PAIN, URINARY PAIN


Time Seen by Provider: 10/31/18 20:19


Mode of Arrival: Ambulatory


Information source: Patient


Notes: 





53-year-old female with chronic kidney stones, hypertension, hyperlipidemia, 

chronic pack pain currently in pain management presents with complaint of right 

flank pain that started 1 day prior to arrival.  She states yesterday she had 

an achy pain of her right flank that radiated to her right lower quadrant.  She 

states it resolved but then resumed again today at 3 PM and now describes it as 

a constant stabbing pain associated with one episode of nausea and vomiting.  

Patient states that she has stones monthly.  She only once was not able to pass 

them on her own.  She denies any fever, chills, chest pain, shortness of 

breath.  She does admit to suprapubic pressure but denies hematuria dysuria and 

vaginal discharge.


TRAVEL OUTSIDE OF THE [place] IN LAST 30 DAYS: No





- HPI


Onset: Yesterday


Onset/Duration: Gradual, Intermittent, Worse


Quality of pain: Stabbing


Severity: Moderate





- Related Data


Allergies/Adverse Reactions: 


 





latex [Latex] Allergy (Verified 10/31/18 20:15)


 


prednisone [Prednisone] Allergy (Verified 10/31/18 20:15)


 











Past Medical History





- Social History


Smoking Status: Current Every Day Smoker


Chew tobacco use (# tins/day): No


Frequency of alcohol use: Occasional


Drug Abuse: None


Family History: Reviewed & Not Pertinent, Other - Blood clot, daughter Son and 

mother kidney disease


Patient has suicidal ideation: No


Patient has homicidal ideation: No





- Past Medical History


Cardiac Medical History: Reports: Hx DVT, Hx Hypercholesterolemia, Hx 

Hypertension


Pulmonary Medical History: Reports: Hx Bronchitis, Hx COPD, Hx Pneumonia


Neurological Medical History: Reports: Hx Migraine.  Denies: Hx Seizures


Renal/ Medical History: Reports: Hx Kidney Stones.  Denies: Hx Peritoneal 

Dialysis


GI Medical History: Reports: Hx Gastroesophageal Reflux Disease, Hx Colonoscopy

, Hx Endoscopy


Musculoskeletal Medical History: Reports Hx Arthritis, Reports Hx 

Musculoskeletal Trauma


Psychiatric Medical History: Reports: Hx Anxiety, Hx Depression


Traumatic Medical History: Reports: Hx Fractures - Foot


Past Surgical History: Reports: Hx Hysterectomy, Hx Orthopedic Surgery - back x 

6, right knee, Hx Tubal Ligation





- Immunizations


Immunizations up to date: Yes


Hx Diphtheria, Pertussis, Tetanus Vaccination: Yes





Physical Exam





- Vital signs


Vitals: 


 











Temp Pulse Resp BP Pulse Ox


 


 98.1 F   70   16   142/80 H  98 


 


 10/31/18 20:18  10/31/18 20:18  10/31/18 20:18  10/31/18 20:18  10/31/18 20:18














Course





- Re-evaluation


Re-evalutation: 





11/01/18 13:48


Presents with findings consistent with acute nephrolithiasis.  Urinalysis does 

show hematuria.  Pain was able to be controlled here in the emergency 

department.  Patient is tolerating oral intake.  Clinical history is not 

consistent with an acute abdominal aneurysm or dissection, MI, or pulmonary 

embolus.  Urinalysis does not show findings consistent with an infected stone.  

Vitals have remained within normal limits.  Patient will be discharged with 

recommendations to follow-up with urology, pain medications, and return 

precautions.  They are in agreement with this plan and verbalized indications 

return to emergency department.





- Vital Signs


Vital signs: 


 











Temp Pulse Resp BP Pulse Ox


 


 98.1 F   70   16   142/80 H  98 


 


 10/31/18 20:18  10/31/18 20:18  10/31/18 20:18  10/31/18 20:18  10/31/18 20:18














- Laboratory


Laboratory results interpreted by me: 


 











  10/31/18





  19:41


 


Urine Protein  30 H


 


Urine Blood  MODERATE H


 


Ur Leukocyte Esterase  MODERATE H














- Diagnostic Test


Radiology reviewed: Image reviewed, Reports reviewed





Discharge





- Discharge


Clinical Impression: 


 Elevated blood pressure reading





Urolithiasis


Qualifiers:


 Urinary calculus location: lower urinary tract Qualified Code(s): N21.9 - 

Calculus of lower urinary tract, unspecified





Hematuria


Qualifiers:


 Hematuria type: unspecified type Qualified Code(s): R31.9 - Hematuria, 

unspecified





Condition: Good


Disposition: HOME, SELF-CARE


Instructions:  Kidney Stone ([facility])


Additional Instructions: 


Your symptoms should improve over the course of the next one week.  If you 

continue to have pain for greater than one week or your pain is not controlled 

with the pain medications that you have been sent home with you need to return 

to the emergency department.  Please also return if you develop fever, 

persistent vomiting, or any other symptoms that are concerning to you.  You 

should take Toradol 10 mg every 6 hours and use your already prescribed pain 

medication.  You are also been sent home with a medication called Flomax to 

help pass the stone.  You've been given Zofran to assist with nausea.  Please 

follow-up with urology in the next 2-3 days.


Prescriptions: 


Ketorolac Tromethamine [Toradol 10 mg Tablet] 10 mg PO Q6HP PRN #20 tablet


 PRN Reason: 


Ondansetron [Zofran Odt 4 mg Tablet] 1 - 2 tab PO Q4H PRN #15 tab.rapdis


 PRN Reason: For Nausea/Vomiting


Tamsulosin HCl [Flomax 0.4 mg Cap.sr] 0.4 mg PO DAILY #7 cap.sr.24h


Forms:  Elevated Blood Pressure


Referrals: 


BART COWAN MD [Primary Care Provider] - Follow up in 3-5 days

## 2018-11-29 ENCOUNTER — HOSPITAL ENCOUNTER (EMERGENCY)
Dept: HOSPITAL 62 - ER | Age: 53
Discharge: HOME | End: 2018-11-29
Payer: MEDICAID

## 2018-11-29 VITALS — SYSTOLIC BLOOD PRESSURE: 129 MMHG | DIASTOLIC BLOOD PRESSURE: 72 MMHG

## 2018-11-29 DIAGNOSIS — R05: ICD-10-CM

## 2018-11-29 DIAGNOSIS — I10: ICD-10-CM

## 2018-11-29 DIAGNOSIS — F17.210: ICD-10-CM

## 2018-11-29 DIAGNOSIS — R06.02: ICD-10-CM

## 2018-11-29 DIAGNOSIS — J44.9: ICD-10-CM

## 2018-11-29 DIAGNOSIS — E78.5: ICD-10-CM

## 2018-11-29 DIAGNOSIS — R50.9: ICD-10-CM

## 2018-11-29 DIAGNOSIS — J18.9: Primary | ICD-10-CM

## 2018-11-29 PROCEDURE — 71045 X-RAY EXAM CHEST 1 VIEW: CPT

## 2018-11-29 PROCEDURE — 93005 ELECTROCARDIOGRAM TRACING: CPT

## 2018-11-29 PROCEDURE — 93010 ELECTROCARDIOGRAM REPORT: CPT

## 2018-11-29 PROCEDURE — 94640 AIRWAY INHALATION TREATMENT: CPT

## 2018-11-29 PROCEDURE — 99285 EMERGENCY DEPT VISIT HI MDM: CPT

## 2018-11-29 PROCEDURE — 96374 THER/PROPH/DIAG INJ IV PUSH: CPT

## 2018-11-29 RX ADMIN — ALBUTEROL SULFATE SCH MG: 2.5 SOLUTION RESPIRATORY (INHALATION) at 14:23

## 2018-11-29 RX ADMIN — ALBUTEROL SULFATE SCH MG: 2.5 SOLUTION RESPIRATORY (INHALATION) at 12:50

## 2018-11-29 NOTE — RADIOLOGY REPORT (SQ)
EXAM DESCRIPTION:  CHEST SINGLE VIEW



COMPLETED DATE/TIME:  11/29/2018 2:17 pm



REASON FOR STUDY:  Shortness of breath and cough



COMPARISON:  4/26/2018



EXAM PARAMETERS:  NUMBER OF VIEWS: One view.

TECHNIQUE: Single frontal radiographic view of the chest acquired.

RADIATION DOSE: NA

LIMITATIONS: None.



FINDINGS:  LUNGS AND PLEURA: Left perihilar parenchymal opacity.  Minimal atelectasis in the right mi
d zone.  No pneumothorax.

MEDIASTINUM AND HILAR STRUCTURES: No masses.  Contour normal.

HEART AND VASCULAR STRUCTURES: Heart normal in size.  Normal vasculature.

BONES: No acute findings.

HARDWARE: None in the chest.

OTHER: No other significant finding.



IMPRESSION:  Patchy pneumonitis left greater than right.



TECHNICAL DOCUMENTATION:  JOB ID:  [id number]

 2011 [employer]- All Rights Reserved



Reading location - IP/workstation name: [email or web address]

## 2018-11-29 NOTE — ER DOCUMENT REPORT
ED General





- General


Chief Complaint: Shortness Of Breath


Stated Complaint: SHORTNESS OF BREATH


Time Seen by Provider: 11/29/18 12:30


Notes: 





Patient is a 53-year-old female that presents to the emergency department for 

chief complaint of shortness of breath and cough.  Patient reports having cough 

for the last 48 hours, and since the cough is started, she has had parasternal 

pain with the cough.  She describes his pain as an aching sensation, that is 

worse with a cough.  She reports possible history of COPD, but this was not 

confirmed by her primary care physician, she is not currently on any inhalers.  

She has had subjective fevers for the last 2 days as well, with sweats.  No 

sick contacts that she is aware of.  She denies headache, lightheadedness, 

nausea, vomiting, abdominal pain, dysuria or hematuria.  No other complaints at 

this time.





Past Medical History: Hypertension, hyperlipidemia, remote history of PE, not 

currently on blood thinners


Past Surgical History: Knee surgery


Social History: Admits to smoking cigarettes daily, denies alcohol or drug use


Family History: Reviewed and noncontributory for presenting illness


Allergies: Reviewed, see documented allergy list. 





REVIEW OF SYSTEMS:


Other than noted above, the 12 point review of systems was reviewed with the 

patient and were negative, all pertinent findings are included in the HPI.





PHYSICAL EXAMINATION:





Vital signs reviewed, nursing noted reviewed. 





GENERAL: Well-appearing, well-nourished and in no acute distress.





HEAD: Atraumatic, normocephalic.





EYES: Eyes appear normal, extraocular movements intact, sclera anicteric, 

conjunctiva are normal.





ENT: nares patent, oropharynx clear without exudates.  Moist mucous membranes.





NECK: Normal range of motion, supple without lymphadenopathy





LUNGS: Mild expiratory wheezing noted throughout all lung fields, no rhonchi or 

rales noted, no acute respiratory distress.  Patient does have reproducible 

chest wall tenderness, along the parasternal junction bilaterally





HEART: Regular rate and rhythm without murmurs





ABDOMEN: Soft, nontender, normoactive bowel sounds.  No rebound, guarding, or 

rigidity. No masses appreciated.





EXTREMITIES: Nontender, good range of motion, no pitting or edema.  





NEUROLOGICAL: No focal neurological deficits. Moves all extremities 

spontaneously Motor and sensory grossly intact on exam.





PSYCH: Normal mood, normal affect.





SKIN: Warm, Dry, normal turgor, no rashes or lesions noted on exposed skin





TRAVEL OUTSIDE OF THE [place] IN LAST 30 DAYS: No





- Related Data


Allergies/Adverse Reactions: 


 





latex [Latex] Allergy (Verified 11/29/18 12:27)


 


prednisone [Prednisone] Allergy (Verified 11/29/18 12:27)


 











Past Medical History





- Social History


Smoking Status: Current Every Day Smoker


Chew tobacco use (# tins/day): No


Frequency of alcohol use: None


Drug Abuse: None


Family History: Reviewed & Not Pertinent, Other - Blood clot, daughter Son and 

mother kidney disease


Patient has suicidal ideation: No


Patient has homicidal ideation: No





- Past Medical History


Cardiac Medical History: Reports: Hx DVT, Hx Hypercholesterolemia, Hx 

Hypertension


Pulmonary Medical History: Reports: Hx Bronchitis, Hx COPD, Hx Pneumonia


Neurological Medical History: Reports: Hx Migraine.  Denies: Hx Seizures


Renal/ Medical History: Reports: Hx Kidney Stones.  Denies: Hx Peritoneal 

Dialysis


GI Medical History: Reports: Hx Gastroesophageal Reflux Disease, Hx Colonoscopy

, Hx Endoscopy


Musculoskeletal Medical History: Reports Hx Arthritis, Reports Hx 

Musculoskeletal Trauma


Psychiatric Medical History: Reports: Hx Anxiety, Hx Depression


Traumatic Medical History: Reports: Hx Fractures - Foot


Past Surgical History: Reports: Hx Hysterectomy, Hx Orthopedic Surgery - back x 

6, right knee, Hx Tubal Ligation





- Immunizations


Immunizations up to date: Yes


Hx Diphtheria, Pertussis, Tetanus Vaccination: Yes





Physical Exam





- Vital signs


Vitals: 


 











Temp Pulse Resp BP Pulse Ox


 


 97.6 F   99   28 H  140/84 H  95 


 


 11/29/18 11:40  11/29/18 11:40  11/29/18 11:40  11/29/18 11:40  11/29/18 11:40














Course





- Re-evaluation


Re-evalutation: 





Patient seen and examined vital signs reviewed. 





Laboratory data and imaging were ordered as appropriate for the patient's 

presenting symptoms and complaint, with consideration of any critical or life 

threatening conditions that may be associated with their obtained history and 

exam as noted above.





Patient was treated with doxycycline, with DuoNeb breathing treatment, and 

Toradol for her pain





Results were reviewed when available and demonstrated chest x-ray, consistent 

for pneumonia





The patient was re-evaluated and was improved, but still having a dry cough, 

she is mildly tachycardic, but this was after DuoNeb breathing treatment





Evaluation was most consistent with community-acquired pneumonia, will 

discharge the patient home with 10 days of doxycycline, and an albuterol 

inhaler to use at home at least every 4 hours, patient was advised if her 

symptoms do not improve in the next 24-48 hours that she should return to the 

emergency department to be reevaluated and possibly admitted for failed 

outpatient treatment for pneumonia.





Results were discussed with the patient at this point, after careful 

consideration I feel that that patient can be discharged from the emergency 

department, the patient was educated treatments and reasons to return to the 

emergency department based on their presumed diagnosis as noted above, they 

were advised to followup with a primary care physician in 2-3 days. Patient was 

agreeable to plan of care.





*Note is created using voice recognition software and may contain spelling, 

syntax or grammatical errors.








 





Chest X-Ray  11/29/18 12:33


IMPRESSION:  Patchy pneumonitis left greater than right.


 

















- Vital Signs


Vital signs: 


 











Temp Pulse Resp BP Pulse Ox


 


 98.3 F   103 H  28 H  129/72 H  97 


 


 11/29/18 15:54  11/29/18 15:54  11/29/18 15:54  11/29/18 15:54  11/29/18 15:54














Discharge





- Discharge


Clinical Impression: 


Community acquired pneumonia


Qualifiers:


 Laterality: left Lung location: unspecified part of lung Qualified Code(s): 

J18.9 - Pneumonia, unspecified organism





Condition: Stable


Disposition: HOME, SELF-CARE


Instructions:  Pneumonia ([facility])


Additional Instructions: 


Please return to the emergency department if you have any worsening, or concern 

of your symptoms.





Please return to the emergency department if you develop chest pain, difficulty 

breathing, severe abdominal pain, or ongoing vomiting.





Please follow-up with your primary care physician in 2-3 days and any other 

recommended physicians.





If prescribed, take all medications as directed.  





If you have any questions or concerns do not hesitate to return the emergency 

department for evaluation. 


Prescriptions: 


Doxycycline Hyclate 100 mg PO BID #20 capsule


Naproxen [Naprosyn] 500 mg PO BID PRN #30 tablet


 PRN Reason: rib pain


Referrals: 


BART COWAN MD [Primary Care Provider] - Follow up in 3-5 days

## 2018-11-29 NOTE — EKG REPORT
SEVERITY:- ABNORMAL ECG -

SINUS TACHYCARDIA

CONSIDER POSTERIOR INFARCT

NONSPECIFIC REPOL ABNORMALITY, DIFFUSE LEADS

:

Confirmed by: Jabari Braxton MD 29-Nov-2018 19:05:04

## 2018-11-29 NOTE — ER DOCUMENT REPORT
ED Medical Screen (RME)





- General


Chief Complaint: Shortness Of Breath


Stated Complaint: SHORTNESS OF BREATH


Time Seen by Provider: 11/29/18 12:30


Notes: 





53 years old female presents today with shortness of breath and cough for the 

last 2 days.  With a history of smoking.  Feverish yesterday.


No chest pain.  No other constitutional symptoms





Bilaterally decreased breath sounds


TRAVEL OUTSIDE OF THE [place] IN LAST 30 DAYS: No





- Related Data


Allergies/Adverse Reactions: 


 





latex [Latex] Allergy (Verified 11/29/18 12:27)


 


prednisone [Prednisone] Allergy (Verified 11/29/18 12:27)


 











Past Medical History





- Social History


Chew tobacco use (# tins/day): No


Frequency of alcohol use: None


Drug Abuse: None





- Past Medical History


Cardiac Medical History: Reports: Hx DVT, Hx Hypercholesterolemia, Hx 

Hypertension


Pulmonary Medical History: Reports: Hx Bronchitis, Hx COPD, Hx Pneumonia


Neurological Medical History: Reports: Hx Migraine.  Denies: Hx Seizures


Renal/ Medical History: Reports: Hx Kidney Stones.  Denies: Hx Peritoneal 

Dialysis


GI Medical History: Reports: Hx Gastroesophageal Reflux Disease, Hx Colonoscopy

, Hx Endoscopy


Musculoskeltal Medical History: Reports Hx Arthritis, Reports Hx 

Musculoskeletal Trauma


Psychiatric Medical History: Reports: Hx Anxiety, Hx Depression


Traumatic Medical History: Reports: Hx Fractures - Foot


Past Surgical History: Reports: Hx Hysterectomy, Hx Orthopedic Surgery - back x 

6, right knee, Hx Tubal Ligation





- Immunizations


Immunizations up to date: Yes


Hx Diphtheria, Pertussis, Tetanus Vaccination: Yes


History of Influenza Vaccine for 10/2017 - 3/2018 Season: No





Physical Exam





- Vital signs


Vitals: 





 











Temp Pulse Resp BP Pulse Ox


 


 97.6 F   99   28 H  140/84 H  95 


 


 11/29/18 11:40  11/29/18 11:40  11/29/18 11:40  11/29/18 11:40  11/29/18 11:40














Course





- Vital Signs


Vital signs: 





 











Temp Pulse Resp BP Pulse Ox


 


 97.6 F   99   28 H  140/84 H  95 


 


 11/29/18 11:40  11/29/18 11:40  11/29/18 11:40  11/29/18 11:40  11/29/18 11:40














Doctor's Discharge





- Discharge


Referrals: 


BART COWAN MD [Primary Care Provider] - Follow up as needed

## 2018-12-04 ENCOUNTER — HOSPITAL ENCOUNTER (OUTPATIENT)
Dept: HOSPITAL 62 - OD | Age: 53
End: 2018-12-04
Attending: INTERNAL MEDICINE
Payer: MEDICAID

## 2018-12-04 DIAGNOSIS — N18.3: Primary | ICD-10-CM

## 2018-12-04 LAB
ALBUMIN SERPL-MCNC: 3.7 G/DL (ref 3.5–5)
ALP SERPL-CCNC: 123 U/L (ref 38–126)
ALT SERPL-CCNC: 27 U/L (ref 9–52)
ANION GAP SERPL CALC-SCNC: 11 MMOL/L (ref 5–19)
AST SERPL-CCNC: 17 U/L (ref 14–36)
BILIRUB DIRECT SERPL-MCNC: 0.3 MG/DL (ref 0–0.4)
BILIRUB SERPL-MCNC: 0.3 MG/DL (ref 0.2–1.3)
BUN SERPL-MCNC: 22 MG/DL (ref 7–20)
CALCIUM: 11.7 MG/DL (ref 8.4–10.2)
CHLORIDE SERPL-SCNC: 112 MMOL/L (ref 98–107)
CO2 SERPL-SCNC: 21 MMOL/L (ref 22–30)
GLUCOSE SERPL-MCNC: 87 MG/DL (ref 75–110)
POTASSIUM SERPL-SCNC: 5.3 MMOL/L (ref 3.6–5)
PROT SERPL-MCNC: 6.7 G/DL (ref 6.3–8.2)
SODIUM SERPL-SCNC: 144.1 MMOL/L (ref 137–145)

## 2018-12-04 PROCEDURE — 36415 COLL VENOUS BLD VENIPUNCTURE: CPT

## 2018-12-04 PROCEDURE — 80053 COMPREHEN METABOLIC PANEL: CPT
